# Patient Record
(demographics unavailable — no encounter records)

---

## 2025-01-13 NOTE — DISCUSSION/SUMMARY
[de-identified] : The patient and their family member(s) were advised of the diagnosis- changes I am seeing in the office today and plans going forward..  shoulder pain- left sided overuse, RTC impingement Here is the plan that we have set forth today. 1. resume PT- new Rx given 2, hold overhand throwing for time being- can bat if pain free. 3. follow up in 3 weeks- if not improving may need to consider MRI 4. sling fitted and provided The patient and the family understands the plan of care as described above.  All questions have been answered. Thank you for allowing me to care for JACQUI. Sincerely, Jennifer Kruse, DO, FAAP, CAQ-[facility] I have spent 31 minutes of time on the encounter which excludes teaching and separately reported services.

## 2025-01-13 NOTE — IMAGING
[de-identified] : Constitutional: Healthy, and well nourished in no acute distress.  Psych: Calm, cooperative, grossly normal  Eyes: Normal, sclera non-icteric  Ears, Nose, Mouth, Throat: External inspection of nose and ears does not reveal any scars or masses  Head: Normocephalic  Neck: Neck appears supple without sign of limited or painful ROM  Respiratory: Normal effort, no respiratory distress, no cyanosis  Cardiovascular: Visualized extremities without edema or varicosities, warm, brisk cap refill  Abdominal/GI: Not examined  Skin: No rashes on the extremity examined. Neurological: Patient is awake and alert     Left shoulder- Inspection:  Symmetric  Spurling test for C Spine: negative; No midline point tenderness Muscle Atrophy: Absent  Swelling: Absent   Palpation: Tenderness at subacromial space, triceps into distal tricep tendon. Direct palpation of AC Joint: scantly tender No SC, or clalvicle tenderness otherwise Crepitus: NO    Masses: No palpable masses   Shoulder ROM Forward Flex:  90 /180 deg ABDuction:90 /180 IR @side: low back pocket ER@side : 45deg then grimace   Pain with ROM of the shoulder: moderate in all planes. Scapular Winging during wall push up: deferred                Muscle	Strength or Pain with Strength Testing  Infraspinatus (ER at side): 4+5  Supraspinatus (Ej's): 4+/5  Subscapularis (belly press):  4+5  Trapezius:  deferred  Biceps -curl:  5-/5 Triceps:  5-/5  Deltoid:  5-/5    Special Shoulder Tests:  Impingement: + [place]'s Test: neg  Cross Body Adduction: discomfort Apprehension:neg Sulcus Sign: R 0.5 cm;  L 0.5 cm   ARM:   Inspection: Muscle Atrophy:absent              Palpation: Tenderness NO           Crepitus: NO  Masses : NO

## 2025-01-13 NOTE — HISTORY OF PRESENT ILLNESS
[de-identified] : 1/13/25: Rula returns today for her LT shoulder and upper arm pain. She is feeling worse today with pain level at 10. Pain started again last Saturday, 1/4/25. Stopped going to PT mid-October when she was feeling better (did 5 sessions then did not continue her exercises). Currently back in softball doing winter workouts for the last 6 weeks. She hears a "crackling/ crunching" sound when throwing ball. Trouble with daily activities such as putting coat on.  LHD no night time awakening otherwise healthy and well no radiating pain to hand no paresthesias   9/30/24: Rula returns today for her left bicep. Feeling better today, pain level down to 0. No longer hears any clicking. Currently goes to Pt once a week ([facility] [place]). about 3 sessions in total thus far.  Has returned to games and has been feeling good. right field, catcher, and 1st base.   9/9/24: Rula Mccabe is a 12 year old female who presents today complaining of left bicep pain. She is RHD. She was at softball practice yesterday, 9/8/24, where she was throwing the ball and pain started. She hears a click every time that she throws very hard. Pain when moving arm.  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

## 2025-01-13 NOTE — DATA REVIEWED
[FreeTextEntry1] : 3 view left shoulder immature osseous exam no maria ines fracture no overt widening- to prox humeral physis

## 2025-01-13 NOTE — HISTORY OF PRESENT ILLNESS
[de-identified] : 1/13/25: Rula returns today for her LT shoulder and upper arm pain. She is feeling worse today with pain level at 10. Pain started again last Saturday, 1/4/25. Stopped going to PT mid-October when she was feeling better (did 5 sessions then did not continue her exercises). Currently back in softball doing winter workouts for the last 6 weeks. She hears a "crackling/ crunching" sound when throwing ball. Trouble with daily activities such as putting coat on.  LHD no night time awakening otherwise healthy and well no radiating pain to hand no paresthesias   9/30/24: Rula returns today for her left bicep. Feeling better today, pain level down to 0. No longer hears any clicking. Currently goes to Pt once a week ([facility]). about 3 sessions in total thus far.  Has returned to games and has been feeling good. right field, catcher, and 1st base.   9/9/24: Rula Mccabe is a 12 year old female who presents today complaining of left bicep pain. She is RHD. She was at softball practice yesterday, 9/8/24, where she was throwing the ball and pain started. She hears a click every time that she throws very hard. Pain when moving arm.  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

## 2025-01-13 NOTE — IMAGING
[de-identified] : Constitutional: Healthy, and well nourished in no acute distress.  Psych: Calm, cooperative, grossly normal  Eyes: Normal, sclera non-icteric  Ears, Nose, Mouth, Throat: External inspection of nose and ears does not reveal any scars or masses  Head: Normocephalic  Neck: Neck appears supple without sign of limited or painful ROM  Respiratory: Normal effort, no respiratory distress, no cyanosis  Cardiovascular: Visualized extremities without edema or varicosities, warm, brisk cap refill  Abdominal/GI: Not examined  Skin: No rashes on the extremity examined. Neurological: Patient is awake and alert     Left shoulder- Inspection:  Symmetric  Spurling test for C Spine: negative; No midline point tenderness Muscle Atrophy: Absent  Swelling: Absent   Palpation: Tenderness at subacromial space, triceps into distal tricep tendon. Direct palpation of AC Joint: scantly tender No SC, or clalvicle tenderness otherwise Crepitus: NO    Masses: No palpable masses   Shoulder ROM Forward Flex:  90 /180 deg ABDuction:90 /180 IR @side: low back pocket ER@side : 45deg then grimace   Pain with ROM of the shoulder: moderate in all planes. Scapular Winging during wall push up: deferred                Muscle	Strength or Pain with Strength Testing  Infraspinatus (ER at side): 4+5  Supraspinatus (Ej's): 4+/5  Subscapularis (belly press):  4+5  Trapezius:  deferred  Biceps -curl:  5-/5 Triceps:  5-/5  Deltoid:  5-/5    Special Shoulder Tests:  Impingement: + [place]'s Test: neg  Cross Body Adduction: discomfort Apprehension:neg Sulcus Sign: R 0.5 cm;  L 0.5 cm   ARM:   Inspection: Muscle Atrophy:absent              Palpation: Tenderness NO           Crepitus: NO  Masses : NO

## 2025-01-13 NOTE — DISCUSSION/SUMMARY
[de-identified] : The patient and their family member(s) were advised of the diagnosis- changes I am seeing in the office today and plans going forward..  shoulder pain- left sided overuse, RTC impingement Here is the plan that we have set forth today. 1. resume PT- new Rx given 2, hold overhand throwing for time being- can bat if pain free. 3. follow up in 3 weeks- if not improving may need to consider MRI 4. sling fitted and provided The patient and the family understands the plan of care as described above.  All questions have been answered. Thank you for allowing me to care for JACQUI. Sincerely, Jennifer Kruse, DO, FAAP, CAQ-[facility] I have spent 31 minutes of time on the encounter which excludes teaching and separately reported services.

## 2025-02-03 NOTE — IMAGING
[de-identified] : Constitutional: Healthy, and well nourished in no acute distress.  Psych: Calm, cooperative, grossly normal  Eyes: Normal, sclera non-icteric  Ears, Nose, Mouth, Throat: External inspection of nose and ears does not reveal any scars or masses  Head: Normocephalic  Neck: Neck appears supple without sign of limited or painful ROM  Respiratory: Normal effort, no respiratory distress, no cyanosis  Cardiovascular: Visualized extremities without edema or varicosities, warm, brisk cap refill  Abdominal/GI: Not examined  Skin: No rashes on the extremity examined. Neurological: Patient is awake and alert     Left shoulder- Inspection:  Symmetric  Spurling test for C Spine: negative; No midline point tenderness Muscle Atrophy: Absent  Swelling: Absent   Palpation: Tenderness at shoulder negative. but has discomfort over left rhomboid into left levator Direct palpation of AC Joint:NONTENDER No SC, or clavicle tenderness otherwise Crepitus: NO    Masses: No palpable masses   Shoulder ROM Forward Flex:  170 /180 deg ABDuction:170 /180 IR @side: T10 ER@side : 60  Pain with ROM of the shoulder: mid with cross body and pain with resisted ROM but activates the RTC in all planes 5-/5 globally. Scapular Winging during wall push up: deferred               Muscle	Strength or Pain with Strength Testing  Infraspinatus (ER at side): 4+5  Supraspinatus (Ej's): 5-/5  Subscapularis (belly press):  4+5  Trapezius:  deferred  Biceps -curl:  5-/5 Triceps:  5-/5  Deltoid:  5-/5   Special Shoulder Tests:  Impingement: NEG today [place]'s Test: neg  Cross Body Adduction: discomfort- mild- left rhomboid Apprehension:neg Sulcus Sign: R 0.5 cm;  L 0.5 cm   ARM:   Inspection: Muscle Atrophy:absent              Palpation: Tenderness NO           Crepitus: NO  Masses : NO   enrique forward bend- left thoracic rib prominence.

## 2025-02-03 NOTE — HISTORY OF PRESENT ILLNESS
[6] : 6 [de-identified] : 2/3/25 pt is here to fu on her left shoulder today. States pain has been on and off. with new pain in the mid back and scapula. PT 2x a week at [facility] and chiropractor is 1-2x a week as needed.   pain is in a totally new position today She also was recently screened by school nurse with scoli Period was March 2024- here with mom today  1/13/25: Rula returns today for her LT shoulder and upper arm pain. She is feeling worse today with pain level at 10. Pain started again last Saturday, 1/4/25. Stopped going to PT mid-October when she was feeling better (did 5 sessions then did not continue her exercises). Currently back in softball doing winter workouts for the last 6 weeks. She hears a "crackling/ crunching" sound when throwing ball. Trouble with daily activities such as putting coat on.  LHD no night time awakening otherwise healthy and well no radiating pain to hand no paresthesias   9/30/24: Rula returns today for her left bicep. Feeling better today, pain level down to 0. No longer hears any clicking. Currently goes to Pt once a week ([facility] [place]). about 3 sessions in total thus far.  Has returned to games and has been feeling good. right field, catcher, and 1st base.   9/9/24: Rula Mccabe is a 12 year old female who presents today complaining of left bicep pain. She is RHD. She was at softball practice yesterday, 9/8/24, where she was throwing the ball and pain started. She hears a click every time that she throws very hard. Pain when moving arm.  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

## 2025-02-03 NOTE — HISTORY OF PRESENT ILLNESS
[6] : 6 [de-identified] : 2/3/25 pt is here to fu on her left shoulder today. States pain has been on and off. with new pain in the mid back and scapula. PT 2x a week at [facility] and chiropractor is 1-2x a week as needed.   pain is in a totally new position today She also was recently screened by school nurse with scoli Period was March 2024- here with mom today  1/13/25: Rula returns today for her LT shoulder and upper arm pain. She is feeling worse today with pain level at 10. Pain started again last Saturday, 1/4/25. Stopped going to PT mid-October when she was feeling better (did 5 sessions then did not continue her exercises). Currently back in softball doing winter workouts for the last 6 weeks. She hears a "crackling/ crunching" sound when throwing ball. Trouble with daily activities such as putting coat on.  LHD no night time awakening otherwise healthy and well no radiating pain to hand no paresthesias   9/30/24: Rula returns today for her left bicep. Feeling better today, pain level down to 0. No longer hears any clicking. Currently goes to Pt once a week ([facility] [place]). about 3 sessions in total thus far.  Has returned to games and has been feeling good. right field, catcher, and 1st base.   9/9/24: Rula Mccabe is a 12 year old female who presents today complaining of left bicep pain. She is RHD. She was at softball practice yesterday, 9/8/24, where she was throwing the ball and pain started. She hears a click every time that she throws very hard. Pain when moving arm.  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

## 2025-02-03 NOTE — IMAGING
[de-identified] : Constitutional: Healthy, and well nourished in no acute distress.  Psych: Calm, cooperative, grossly normal  Eyes: Normal, sclera non-icteric  Ears, Nose, Mouth, Throat: External inspection of nose and ears does not reveal any scars or masses  Head: Normocephalic  Neck: Neck appears supple without sign of limited or painful ROM  Respiratory: Normal effort, no respiratory distress, no cyanosis  Cardiovascular: Visualized extremities without edema or varicosities, warm, brisk cap refill  Abdominal/GI: Not examined  Skin: No rashes on the extremity examined. Neurological: Patient is awake and alert     Left shoulder- Inspection:  Symmetric  Spurling test for C Spine: negative; No midline point tenderness Muscle Atrophy: Absent  Swelling: Absent   Palpation: Tenderness at shoulder negative. but has discomfort over left rhomboid into left levator Direct palpation of AC Joint:NONTENDER No SC, or clavicle tenderness otherwise Crepitus: NO    Masses: No palpable masses   Shoulder ROM Forward Flex:  170 /180 deg ABDuction:170 /180 IR @side: T10 ER@side : 60  Pain with ROM of the shoulder: mid with cross body and pain with resisted ROM but activates the RTC in all planes 5-/5 globally. Scapular Winging during wall push up: deferred               Muscle	Strength or Pain with Strength Testing  Infraspinatus (ER at side): 4+5  Supraspinatus (Ej's): 5-/5  Subscapularis (belly press):  4+5  Trapezius:  deferred  Biceps -curl:  5-/5 Triceps:  5-/5  Deltoid:  5-/5   Special Shoulder Tests:  Impingement: NEG today [place]'s Test: neg  Cross Body Adduction: discomfort- mild- left rhomboid Apprehension:neg Sulcus Sign: R 0.5 cm;  L 0.5 cm   ARM:   Inspection: Muscle Atrophy:absent              Palpation: Tenderness NO           Crepitus: NO  Masses : NO   enrique forward bend- left thoracic rib prominence.

## 2025-02-03 NOTE — DISCUSSION/SUMMARY
[de-identified] : The patient and their family member(s) were advised of the diagnosis- changes I am seeing in the office today and plans going forward..  shoulder pain- left  upper back- new from last visit. spinal asymmetry- possible adolescent scoli- left thoracic rib proimincence plan: scoli films obtain MRI left shoulder- pain on and off for 1.5 years- with variable improvement with PT- pain is migratory follow up in 1-2 weeks to review MRI The patient and the family understands the plan of care as described above.  All questions have been answered. Thank you for allowing me to care for JACQUI. Sincerely, Jennifer Kruse, DO, FAAP, CAQ-[facility] I have spent 31 minutes of time on the encounter which excludes teaching and separately reported services.

## 2025-02-03 NOTE — DISCUSSION/SUMMARY
[de-identified] : The patient and their family member(s) were advised of the diagnosis- changes I am seeing in the office today and plans going forward..  shoulder pain- left  upper back- new from last visit. spinal asymmetry- possible adolescent scoli- left thoracic rib proimincence plan: scoli films obtain MRI left shoulder- pain on and off for 1.5 years- with variable improvement with PT- pain is migratory follow up in 1-2 weeks to review MRI The patient and the family understands the plan of care as described above.  All questions have been answered. Thank you for allowing me to care for JACQUI. Sincerely, Jennifer Kruse, DO, FAAP, CAQ-[facility] I have spent 31 minutes of time on the encounter which excludes teaching and separately reported services.

## 2025-02-20 NOTE — DISCUSSION/SUMMARY
[de-identified] : The patient and their family member(s) were advised of the diagnosis- changes I am seeing in the office today and plans going forward..  shoulder pain- left - improving with PT adolescent scoli- in the brace range given deg and skeletal maturity plan: 1. referred to Dr. Cruz at [facility]- discussed scoli in detail, how to monitor and the adjunct of bracing and possibly schroth PT 2. continue with PT for shoulder, upper back strength 3. follow up with me as needed. 4. recommended that sibling at 11 is screened now and annually The patient and the family understands the plan of care as described above.  All questions have been answered. Thank you for allowing me to care for JACQUI. Sincerely, Jennifer Kruse, DO, FAAP, CAQ[facility] I have spent 35minutes of time on the encounter which excludes teaching and separately reported services.

## 2025-02-20 NOTE — IMAGING
[de-identified] : Constitutional: Healthy, and well nourished in no acute distress.  Psych: Calm, cooperative, grossly normal  Eyes: Normal, sclera non-icteric  Ears, Nose, Mouth, Throat: External inspection of nose and ears does not reveal any scars or masses  Head: Normocephalic  Neck: Neck appears supple without sign of limited or painful ROM  Respiratory: Normal effort, no respiratory distress, no cyanosis  Cardiovascular: Visualized extremities without edema or varicosities, warm, brisk cap refill  Abdominal/GI: Not examined  Skin: No rashes on the extremity examined. Neurological: Patient is awake and alert    Upper extremity motor strength: normal Patellar deep tendon reflexes were normal Babinski test is not done Ankle clonus is absent Abdominal reflexes are symmetric   MUSCULOSKELETAL EXAM  Spine Exam: Shoulder Level: right slightly higher Iliac Crest Height: left (mild)  Leg Lengths: left mild  Scapular Prominence: NO  Spine tenderness to palpation: NO Pain - forward bending:none Pain - lateral bending:none Pain- extension: none Popliteal Angles - Bilateral:  Lag of -10 degrees bilat  Joseph's Forward Bending Test: Cervical: None Thoracic: upper right, mid left Lumbar:  no maria ines curvature seen Sagittal Contour: Normal Curve stiffness:  Flexible

## 2025-02-20 NOTE — HISTORY OF PRESENT ILLNESS
[6] : 6 [de-identified] : 2/20/25 1. pt is here (with mom) for fu and mri review of her left shoulder today. PT 2x a week with good relief. ROM has improved.  2. she was sent for full scoliosis images and is returning to review those results as well. recounting medical hx- mom may have had some scoliosis as a child.  Rula has an 10yo sibling that has no scoliosis known to date, Rula's 1st menarche was April 2024.  She has grown a good amount in the last year. PCP saw her scoli- used a scoliometer but was monitoring.  Of note- she recently tested for a venom allergy- seeing allergist. now has epipen  2/3/25 pt is here to fu on her left shoulder today. States pain has been on and off. with new pain in the mid back and scapula. PT 2x a week at [facility] and chiropractor is 1-2x a week as needed.   pain is in a totally new position today She also was recently screened by school nurse with scoli Period was April 2024- here with mom today  1/13/25: Rula returns today for her LT shoulder and upper arm pain. She is feeling worse today with pain level at 10. Pain started again last Saturday, 1/4/25. Stopped going to PT mid-October when she was feeling better (did 5 sessions then did not continue her exercises). Currently back in softball doing winter workouts for the last 6 weeks. She hears a "crackling/ crunching" sound when throwing ball. Trouble with daily activities such as putting coat on.  LHD no night time awakening otherwise healthy and well no radiating pain to hand no paresthesias   9/30/24: Rula returns today for her left bicep. Feeling better today, pain level down to 0. No longer hears any clicking. Currently goes to Pt once a week ([facility] [place]). about 3 sessions in total thus far.  Has returned to games and has been feeling good. right field, catcher, and 1st base.   9/9/24: Rula Mccabe is a 12 year old female who presents today complaining of left bicep pain. She is RHD. She was at softball practice yesterday, 9/8/24, where she was throwing the ball and pain started. She hears a click every time that she throws very hard. Pain when moving arm.  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

## 2025-02-20 NOTE — DATA REVIEWED
[FreeTextEntry1] : IMAGING:  X-Rays DATE:2/25/25 ([facility] imaging)					 Direction of curve apex		 THORACIC: upper to the right-21 Deg; mid to the left 29 deg		 LUMBAR:  right of 20 deg		 RISER SIGN (0-5):  3	  MRI left shoulder done at [facility] normal shoulder evaluation no bony, soft tissue or surgical indication at this time.

## 2025-02-27 NOTE — DEVELOPMENTAL MILESTONES
[Normal] : Developmental history within normal limits [See scanned document for history] : See scanned document for history [Walk ___ Months] : Walk: [unfilled] months [Left] : left

## 2025-03-06 NOTE — DATA REVIEWED
[de-identified] : AP and lateral spine radiographs were ordered, obtained, and independently reviewed in clinic on 02/27/2025 depicting a left thoracolumbar curve measuring 34 degrees. Patient is Risser 4; Delgado 7. Mild postural kyphosis noted on the lateral plane. No evidence of spondylolysis or spondylolisthesis.

## 2025-03-06 NOTE — DATA REVIEWED
[de-identified] : AP and lateral spine radiographs were ordered, obtained, and independently reviewed in clinic on 02/27/2025 depicting a left thoracolumbar curve measuring 34 degrees. Patient is Risser 4; Delgado 7. Mild postural kyphosis noted on the lateral plane. No evidence of spondylolysis or spondylolisthesis.

## 2025-03-06 NOTE — HISTORY OF PRESENT ILLNESS
[FreeTextEntry1] : Rula is a 13-year-old female who presents today with her mother for initial evaluation of her spine. Per mother, school nurse recently noticed asymmetries of the back and had concerns for scoliosis. Mother reports pediatrician did not notice the asymmetries and decided to follow up with their past orthopedist Dr. Kruse. Per mother, Dr. Kruse obtained x-rays showing scoliosis measuring about 30-degree and referred child to my peds ortho office for further evaluation. Child is continuing to grow in height. Menarche reported April 2024. Mother has history of scoliosis, though she underwent no intervention was necessary. Patient denies any recent fevers, chills, or night sweats. Denies any recent trauma or injuries. She denies any back pain, radiating pain, numbness, tingling sensations, discomfort, weakness to LE, radiating LE pain, or bladder/bowel dysfunction. She has been participating in all her normal physical activities without restrictions or discomfort. Here today for further orthopedic evaluation.   The patient's HPI was reviewed thoroughly with patient and parent. The patient's parent has acted as an independent historian regarding the above information due to the unreliable nature of the history obtained from the patient.

## 2025-03-06 NOTE — ASSESSMENT
[FreeTextEntry1] : Rula is a 13-year-old female with adolescent idiopathic scoliosis and postural kyphosis, mild   Today's assessment was performed with the assistance of the patient's parent as an independent historian given the patient's age. Clinical findings and x-ray results were reviewed at length with the patient and parent. We discussed at length the natural history, etiology, pathoanatomy and treatment modalities of scoliosis with patient and parent. Patient's obtained radiographs are remarkable for scoliosis with a left thoracolumbar curve measuring 34 degree. Explained to patient and parent that for curves measuring 25 degrees, a brace regimen is typically implemented for treatment. For curves of 45 degrees or more, surgical intervention is warranted. Patient is age 13, Risser 4, and postmenarche 10 months. Given patient has minimal skeletal growth potential, it is possible for patients curve to progress. We will continue with close observation of patient's progression at this time. As for her posture, I am recommending a daily back and core strengthening exercise regimen to be implemented 4 days a week for at least 30 minutes each day. Exercise sheet was given and exercises were demonstrated during today's visit. Additionally, I am recommending patient begin attending [facility] physical therapy sessions for back and core strengthening exercises; prescription was provided to family. Patient may continue participating in all physical activities without restrictions. All questions and concerns were addressed. Patient and parent vocalized understanding and agreement to assessment and treatment plan. We will plan to see Rula  back in clinic in approximately 1 year for reevaluation with repeat AP and lateral scoliosis x-rays.   Natural history of spine deformity discussed. Risk of progression explained. Spine deformity can cause back pain later on and also arthritis, though usually later. Spine deformity can affect organ systems, such as lungs, less commonly heart and GI etc over time depending on curve size and progression. Deformity can progress with growth but can continue to progress later on based on the size of the curve. It can also effect patient's height due to the curve..It usually does not impact activities and has no limitations, however activities may be limited due to pain or rarely breathlessness with large curves. Scoliosis is usually not impacted by daily activities- sleeping position, sitting position, lifting heavy weights etc, however posture and back pain can be affected by some of these.Stretching, exercises, bone health and nutrition are important factors in the long run. Spine deformity may have genetics etiology and so siblings and progenies should be evaluated.For scoliosis, curves less than 25 degrees are usually managed with observation. Bracing is warranted for curves measuring greater than 25 degrees with skeletal growth remaining. Braces do not correct curves permanently and there is a 30% risk brace failure. Surgery is recommended for scoliosis measuring greater than 45 degrees.  Parent served as the primary historian regarding the above information for this visit to corroborate the patient's history. We also discussed/instructed back, core strengthening and posture correction exercises and going over the proper form as well the need to be regular on a daily basis. Importance was discussed and instructions printed.   I, Crow Lanza, have acted as a scribe and documented the above information for Dr. Cruz on 02/27/2025.   We spent 45 minutes on HPI, Clinical exam, ordering/ reviewing all imaging, reviewing any existing record, reviewing findings and counseling patient to treatment, differentials, etiology, prognosis, natural history, implications on ADLs, activities limitations/modifications, answering questions and addressing concerns, treatment goals and documenting in the EHR.

## 2025-03-06 NOTE — PHYSICAL EXAM
[FreeTextEntry1] : General: Patient is awake and alert and in no acute distress . oriented to person, place, and time. well developed, well nourished, cooperative.   Skin: The skin is intact, warm, pink, and dry over the area examined.    Eyes: normal conjunctiva, normal eyelids and pupils were equal and round.   ENT: normal ears, normal nose and normal lips.  Cardiovascular: There is brisk capillary refill in the digits of the affected extremity. They are symmetric pulses in the bilateral upper and lower extremities, positive peripheral pulses, brisk capillary refill, but no peripheral edema.  Respiratory: The patient is in no apparent respiratory distress. They're taking full deep breaths without use of accessory muscles or evidence of audible wheezes or stridor without the use of a stethoscope, normal respiratory effort.   Musculoskeletal:.  Examination of the back reveals shoulder asymmetry with right shoulder higher than left.  Right scapula is higher than left. Waist asymmetry with right more concave. On forward bending, significant left thoracolumbar prominence noted.  Patient is able to bend forward and touch the toes as well bend backwards without pain.  Lateral flexion is symmetrical and is pain free.  Straight leg raising test is free to more than 70 degrees. Mild postural kyphosis, fully correctable on hyperextension.  Neurological examination reveals a grade 5/5 muscle power.  Sensation is intact to crude touch and pinprick.  Deep tendon reflexes are 1+ with ankle jerk and knee jerk.  The plantars are bilaterally down going.  Superficial abdominal reflexes are symmetric and intact.  The biceps and triceps reflexes are 1+.     There is no hairy patch, lipoma, sinus in the back.  There is no pes cavus, asymmetry of calves, significant leg length discrepancy or significant cafe-au-lait spots.  Child is able to walk on tiptoes as well as heels without difficulty or pain. Child is able to jump and squat

## 2025-03-06 NOTE — ASSESSMENT
[FreeTextEntry1] : Rula is a 13-year-old female with adolescent idiopathic scoliosis and postural kyphosis, mild   Today's assessment was performed with the assistance of the patient's parent as an independent historian given the patient's age. Clinical findings and x-ray results were reviewed at length with the patient and parent. We discussed at length the natural history, etiology, pathoanatomy and treatment modalities of scoliosis with patient and parent. Patient's obtained radiographs are remarkable for scoliosis with a left thoracolumbar curve measuring 34 degree. Explained to patient and parent that for curves measuring 25 degrees, a brace regimen is typically implemented for treatment. For curves of 45 degrees or more, surgical intervention is warranted. Patient is age 13, Risser 4, and postmenarche 10 months. Given patient has minimal skeletal growth potential, it is possible for patients curve to progress. We will continue with close observation of patient's progression at this time. As for her posture, I am recommending a daily back and core strengthening exercise regimen to be implemented 4 days a week for at least 30 minutes each day. Exercise sheet was given and exercises were demonstrated during today's visit. Additionally, I am recommending patient begin attending [facility] physical therapy sessions for back and core strengthening exercises; prescription was provided to family. Patient may continue participating in all physical activities without restrictions. All questions and concerns were addressed. Patient and parent vocalized understanding and agreement to assessment and treatment plan. We will plan to see Rula  back in clinic in approximately 1 year for reevaluation with repeat AP and lateral scoliosis x-rays.   Natural history of spine deformity discussed. Risk of progression explained. Spine deformity can cause back pain later on and also arthritis, though usually later. Spine deformity can affect organ systems, such as lungs, less commonly heart and GI etc over time depending on curve size and progression. Deformity can progress with growth but can continue to progress later on based on the size of the curve. It can also effect patient's height due to the curve..It usually does not impact activities and has no limitations, however activities may be limited due to pain or rarely breathlessness with large curves. Scoliosis is usually not impacted by daily activities- sleeping position, sitting position, lifting heavy weights etc, however posture and back pain can be affected by some of these.Stretching, exercises, bone health and nutrition are important factors in the long run. Spine deformity may have genetics etiology and so siblings and progenies should be evaluated.For scoliosis, curves less than 25 degrees are usually managed with observation. Bracing is warranted for curves measuring greater than 25 degrees with skeletal growth remaining. Braces do not correct curves permanently and there is a 30% risk brace failure. Surgery is recommended for scoliosis measuring greater than 45 degrees.  Parent served as the primary historian regarding the above information for this visit to corroborate the patient's history. We also discussed/instructed back, core strengthening and posture correction exercises and going over the proper form as well the need to be regular on a daily basis. Importance was discussed and instructions printed.   I, Crow Lanza, have acted as a scribe and documented the above information for Dr. Cruz on 02/27/2025.   We spent 45 minutes on HPI, Clinical exam, ordering/ reviewing all imaging, reviewing any existing record, reviewing findings and counseling patient to treatment, differentials, etiology, prognosis, natural history, implications on ADLs, activities limitations/modifications, answering questions and addressing concerns, treatment goals and documenting in the EHR.

## 2025-07-09 NOTE — ASSESSMENT
[FreeTextEntry1] : Rula is a 13-year-old female with adolescent idiopathic scoliosis and postural kyphosis, mild   Today's assessment was performed with the assistance of the patient's parent as an independent historian given the patient's age. Clinical findings and x-ray results were reviewed at length with the patient and parent. We discussed at length the natural history, etiology, pathoanatomy and treatment modalities of scoliosis with patient and parent. Patient's obtained radiographs are remarkable for scoliosis with a left thoracolumbar curve measuring 28 degrees and a right lumbar curve of 26 degrees. Explained to patient and parent that for curves measuring 25 degrees, a brace regimen is typically implemented for treatment. For curves of 45 degrees or more, surgical intervention is warranted. Patient is age 13, Risser 4, and postmenarche 14 months. Given patient has minimal skeletal growth potential, it is possible for patients curve to progress. We will continue with close observation of patient's progression at this time. As for her posture, I am recommending a daily back and core strengthening exercise regimen to be implemented 4 days a week for at least 30 minutes each day. Exercise sheet was given and exercises were demonstrated during today's visit. Additionally, I am recommending patient continue with attending [facility] physical therapy sessions for back and core strengthening exercises; prescription was provided to family. Patient may continue participating in all physical activities without restrictions. All questions and concerns were addressed. Patient and parent vocalized understanding and agreement to assessment and treatment plan. We will plan to see Rula  back in clinic in approximately 6 months for reevaluation with repeat AP and lateral scoliosis x-rays.   Natural history of spine deformity discussed. Risk of progression explained. Spine deformity can cause back pain later on and also arthritis, though usually later. Spine deformity can affect organ systems, such as lungs, less commonly heart and GI etc over time depending on curve size and progression. Deformity can progress with growth but can continue to progress later on based on the size of the curve. It can also effect patient's height due to the curve..It usually does not impact activities and has no limitations, however activities may be limited due to pain or rarely breathlessness with large curves. Scoliosis is usually not impacted by daily activities- sleeping position, sitting position, lifting heavy weights etc, however posture and back pain can be affected by some of these.Stretching, exercises, bone health and nutrition are important factors in the long run. Spine deformity may have genetics etiology and so siblings and progenies should be evaluated.For scoliosis, curves less than 25 degrees are usually managed with observation. Bracing is warranted for curves measuring greater than 25 degrees with skeletal growth remaining. Braces do not correct curves permanently and there is a 30% risk brace failure. Surgery is recommended for scoliosis measuring greater than 45 degrees.  Parent served as the primary historian regarding the above information for this visit to corroborate the patient's history. We also discussed/instructed back, core strengthening and posture correction exercises and going over the proper form as well the need to be regular on a daily basis. Importance was discussed and instructions printed.   We spent 30 minutes on HPI, Clinical exam, ordering/ reviewing all imaging, reviewing any existing record, reviewing findings and counseling patient to treatment, differentials, etiology, prognosis, natural history, implications on ADLs, activities limitations/modifications, answering questions and addressing concerns, treatment goals and documenting in the EHR.

## 2025-07-09 NOTE — DATA REVIEWED
[de-identified] : AP and lateral spine radiographs were ordered, obtained, and independently reviewed in clinic on 07/03/2025 depicting a left thoracolumbar curve measuring 28 degrees. Right lumbar curve of 26 degrees. Patient is Risser 4; Delgado 7. Mild postural kyphosis noted on the lateral plane. No evidence of spondylolysis or spondylolisthesis.   AP and lateral spine radiographs were ordered, obtained, and independently reviewed in clinic on 02/27/2025 depicting a left thoracolumbar curve measuring 34 degrees. Patient is Risser 4; Delgado 7. Mild postural kyphosis noted on the lateral plane. No evidence of spondylolysis or spondylolisthesis.

## 2025-07-09 NOTE — DATA REVIEWED
[de-identified] : AP and lateral spine radiographs were ordered, obtained, and independently reviewed in clinic on 07/03/2025 depicting a left thoracolumbar curve measuring 28 degrees. Right lumbar curve of 26 degrees. Patient is Risser 4; Delgado 7. Mild postural kyphosis noted on the lateral plane. No evidence of spondylolysis or spondylolisthesis.   AP and lateral spine radiographs were ordered, obtained, and independently reviewed in clinic on 02/27/2025 depicting a left thoracolumbar curve measuring 34 degrees. Patient is Risser 4; Delgado 7. Mild postural kyphosis noted on the lateral plane. No evidence of spondylolysis or spondylolisthesis.

## 2025-07-09 NOTE — HISTORY OF PRESENT ILLNESS
[FreeTextEntry1] : Rula is a 13-year-old female who presents today with her mother for follow up evaluation of her spine. Reports she has been going to [facility] physical therapy which she feels has been helping. Has not had any back pain.  Child is continuing to grow in height. Menarche reported April 2024. Mother has history of scoliosis, though she underwent no intervention was necessary. Patient denies any recent fevers, chills, or night sweats. Denies any recent trauma or injuries. She denies any back pain, radiating pain, numbness, tingling sensations, discomfort, weakness to LE, radiating LE pain, or bladder/bowel dysfunction. She has been participating in all her normal physical activities without restrictions or discomfort. Here today for further orthopedic evaluation.   The patient's HPI was reviewed thoroughly with patient and parent. The patient's parent has acted as an independent historian regarding the above information due to the unreliable nature of the history obtained from the patient.

## 2025-07-09 NOTE — HISTORY OF PRESENT ILLNESS
[FreeTextEntry1] : Rula is a 13-year-old female who presents today with her mother for follow up evaluation of her spine. Reports she has been going to [facility] physical therapy which she feels has been helping. Has not had any back pain.  Child is continuing to grow in height. Menarche reported April 2024. Mother has history of scoliosis, though she underwent no intervention was necessary. Patient denies any recent fevers, chills, or night sweats. Denies any recent trauma or injuries. She denies any back pain, radiating pain, numbness, tingling sensations, discomfort, weakness to LE, radiating LE pain, or bladder/bowel dysfunction. She has been participating in all her normal physical activities without restrictions or discomfort. Here today for further orthopedic evaluation.   The patient's HPI was reviewed thoroughly with patient and parent. The patient's parent has acted as an independent historian regarding the above information due to the unreliable nature of the history obtained from the patient.

## 2025-07-09 NOTE — ASSESSMENT
[FreeTextEntry1] : Rula is a 13-year-old female with adolescent idiopathic scoliosis and postural kyphosis, mild   Today's assessment was performed with the assistance of the patient's parent as an independent historian given the patient's age. Clinical findings and x-ray results were reviewed at length with the patient and parent. We discussed at length the natural history, etiology, pathoanatomy and treatment modalities of scoliosis with patient and parent. Patient's obtained radiographs are remarkable for scoliosis with a left thoracolumbar curve measuring 28 degrees and a right lumbar curve of 26 degrees. Explained to patient and parent that for curves measuring 25 degrees, a brace regimen is typically implemented for treatment. For curves of 45 degrees or more, surgical intervention is warranted. Patient is age 13, Risser 4, and postmenarche 14 months. Given patient has minimal skeletal growth potential, it is possible for patients curve to progress. We will continue with close observation of patient's progression at this time. As for her posture, I am recommending a daily back and core strengthening exercise regimen to be implemented 4 days a week for at least 30 minutes each day. Exercise sheet was given and exercises were demonstrated during today's visit. Additionally, I am recommending patient continue with attending [facility] physical therapy sessions for back and core strengthening exercises; prescription was provided to family. Patient may continue participating in all physical activities without restrictions. All questions and concerns were addressed. Patient and parent vocalized understanding and agreement to assessment and treatment plan. We will plan to see Rula  back in clinic in approximately 6 months for reevaluation with repeat AP and lateral scoliosis x-rays.   Natural history of spine deformity discussed. Risk of progression explained. Spine deformity can cause back pain later on and also arthritis, though usually later. Spine deformity can affect organ systems, such as lungs, less commonly heart and GI etc over time depending on curve size and progression. Deformity can progress with growth but can continue to progress later on based on the size of the curve. It can also effect patient's height due to the curve..It usually does not impact activities and has no limitations, however activities may be limited due to pain or rarely breathlessness with large curves. Scoliosis is usually not impacted by daily activities- sleeping position, sitting position, lifting heavy weights etc, however posture and back pain can be affected by some of these.Stretching, exercises, bone health and nutrition are important factors in the long run. Spine deformity may have genetics etiology and so siblings and progenies should be evaluated.For scoliosis, curves less than 25 degrees are usually managed with observation. Bracing is warranted for curves measuring greater than 25 degrees with skeletal growth remaining. Braces do not correct curves permanently and there is a 30% risk brace failure. Surgery is recommended for scoliosis measuring greater than 45 degrees.  Parent served as the primary historian regarding the above information for this visit to corroborate the patient's history. We also discussed/instructed back, core strengthening and posture correction exercises and going over the proper form as well the need to be regular on a daily basis. Importance was discussed and instructions printed.   We spent 30 minutes on HPI, Clinical exam, ordering/ reviewing all imaging, reviewing any existing record, reviewing findings and counseling patient to treatment, differentials, etiology, prognosis, natural history, implications on ADLs, activities limitations/modifications, answering questions and addressing concerns, treatment goals and documenting in the EHR.